# Patient Record
Sex: FEMALE | Race: WHITE | HISPANIC OR LATINO | URBAN - METROPOLITAN AREA
[De-identification: names, ages, dates, MRNs, and addresses within clinical notes are randomized per-mention and may not be internally consistent; named-entity substitution may affect disease eponyms.]

---

## 2024-02-21 ENCOUNTER — TELEMEDICINE (OUTPATIENT)
Dept: FAMILY MEDICINE CLINIC | Facility: TELEHEALTH | Age: 29
End: 2024-02-21
Payer: COMMERCIAL

## 2024-02-21 VITALS — TEMPERATURE: 100.8 F | HEART RATE: 82 BPM

## 2024-02-21 DIAGNOSIS — J02.9 PHARYNGITIS, UNSPECIFIED ETIOLOGY: Primary | ICD-10-CM

## 2024-02-21 DIAGNOSIS — J04.0 LARYNGITIS: ICD-10-CM

## 2024-02-21 RX ORDER — LEVONORGESTREL AND ETHINYL ESTRADIOL 0.1-0.02MG
KIT ORAL
COMMUNITY

## 2024-02-21 RX ORDER — AMOXICILLIN 500 MG/1
500 CAPSULE ORAL 2 TIMES DAILY
Qty: 20 CAPSULE | Refills: 0 | Status: SHIPPED | OUTPATIENT
Start: 2024-02-21 | End: 2024-03-02

## 2024-02-21 RX ORDER — CHOLESTYRAMINE 4 G/9G
1 POWDER, FOR SUSPENSION ORAL
COMMUNITY
Start: 2023-08-28

## 2024-02-21 RX ORDER — PREDNISONE 10 MG/1
TABLET ORAL DAILY
Qty: 21 EACH | Refills: 0 | Status: SHIPPED | OUTPATIENT
Start: 2024-02-21 | End: 2024-02-27

## 2024-02-21 RX ORDER — ALBUTEROL SULFATE 90 UG/1
AEROSOL, METERED RESPIRATORY (INHALATION)
COMMUNITY
Start: 2023-05-01 | End: 2025-04-30

## 2024-02-21 NOTE — PROGRESS NOTES
Subjective   Chief Complaint   Patient presents with    Sore Throat       Renee Villegas is a 28 y.o. female.     History of Present Illness  Tyto visit from School. Patient reports sore throat and headache for 2 days.  She woke up this morning with hoarse voice.  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  Sore Throat   This is a new problem. Episode onset: 2 days. The problem has been unchanged. The maximum temperature recorded prior to her arrival was 100 - 101 F. The fever has been present for 1 to 2 days. Associated symptoms include headaches, a hoarse voice and swollen glands. Pertinent negatives include no abdominal pain, congestion, coughing, diarrhea, drooling, ear pain, neck pain, shortness of breath, trouble swallowing or vomiting.        No Known Allergies    History reviewed. No pertinent past medical history.    History reviewed. No pertinent surgical history.    Social History     Tobacco Use    Smoking status: Never    Smokeless tobacco: Never       History reviewed. No pertinent family history.      Current Outpatient Medications:     albuterol sulfate  (90 Base) MCG/ACT inhaler, INHALE 2 PUFFS BY MOUTH EVERY 4 HOURS AS NEEDED FOR SHORTNESS OF BREATH AND COUGH OR WHEEZING . SHAKE WELL BEFORE USE. 100 DAYS SUPPLY FOR WHEEZING IS 1 CANISTER - USE WITH SPACER DEVICE IF PRESCRIBED (TO REPLACE VENTOLIN), Disp: , Rfl:     cholestyramine (QUESTRAN) 4 g packet, Take 1 packet by mouth., Disp: , Rfl:     sertraline (ZOLOFT) 50 MG tablet, Take 1 tablet by mouth Daily., Disp: , Rfl:     amoxicillin (AMOXIL) 500 MG capsule, Take 1 capsule by mouth 2 (Two) Times a Day for 10 days., Disp: 20 capsule, Rfl: 0    levonorgestrel-ethinyl estradiol (Lutera) 0.1-20 MG-MCG per tablet, , Disp: , Rfl:     predniSONE (DELTASONE) 10 MG (21) dose pack, Take  by mouth Daily for 6 days., Disp: 21 each, Rfl: 0      Review of Systems   Constitutional:  Positive for fatigue. Negative for chills,  diaphoresis and fever.   HENT:  Positive for hoarse voice, sore throat, swollen glands and voice change. Negative for congestion, drooling, ear pain, rhinorrhea and trouble swallowing.    Respiratory:  Negative for cough and shortness of breath.    Gastrointestinal:  Negative for abdominal pain, diarrhea and vomiting.   Musculoskeletal:  Negative for myalgias and neck pain.   Neurological:  Positive for headache.        Vitals:    02/21/24 1426   Pulse: 82   Temp: (!) 100.8 °F (38.2 °C)       Objective   Physical Exam  Constitutional:       General: She is not in acute distress.     Appearance: Normal appearance. She is not ill-appearing, toxic-appearing or diaphoretic.   HENT:      Head: Normocephalic.      Nose: Nose normal.      Mouth/Throat:      Lips: Pink.      Mouth: Mucous membranes are moist.      Pharynx: Pharyngeal swelling and posterior oropharyngeal erythema present.   Pulmonary:      Effort: Pulmonary effort is normal.      Comments: laryngitis  Neurological:      Mental Status: She is alert and oriented to person, place, and time.          Procedures     Assessment & Plan   Diagnoses and all orders for this visit:    1. Pharyngitis, unspecified etiology (Primary)  -     amoxicillin (AMOXIL) 500 MG capsule; Take 1 capsule by mouth 2 (Two) Times a Day for 10 days.  Dispense: 20 capsule; Refill: 0    2. Laryngitis  -     predniSONE (DELTASONE) 10 MG (21) dose pack; Take  by mouth Daily for 6 days.  Dispense: 21 each; Refill: 0        No results found for this or any previous visit.    PLAN: Discussed dosing, side effects, recommended other symptomatic care.  Patient should follow up with primary care provider, Urgent Care or ER if symptoms worsen, fail to resolve or other symptoms need attention. Patient/family agree to the above.         DAI Medina     The use of a video visit has been reviewed with the patient and verbal informed consent has been obtained. Myself and Renee Villegas  participated in this visit. The patient is located at [address] [place] [address] Dr Garcia [address] [place], [place] [id number]. I am located in [place], [place]. Mychart and Zoom were utilized.        This visit was performed via Telehealth.  This patient has been instructed to follow-up with their primary care provider if their symptoms worsen or the treatment provided does not resolve their illness.

## 2024-02-21 NOTE — LETTER
February 21, 2024     Patient: Renee Villegas   YOB: 1995   Date of Visit: 2/21/2024       To Whom It May Concern:    It is my medical opinion that Renee Villegas may return to work on Friday 2/23/2024.           Sincerely,    DAI Medina    CC:   No Recipients

## 2024-02-21 NOTE — PATIENT INSTRUCTIONS
Change toothbrush after 2 days on antibiotics.   Alternate tylenol and motrin for pain and/or fever, stay hydrated and rest.     If symptoms worsen or do not improve follow up with your PCP or visit your nearest Urgent Care Center or ER.